# Patient Record
Sex: FEMALE | Race: WHITE | URBAN - METROPOLITAN AREA
[De-identification: names, ages, dates, MRNs, and addresses within clinical notes are randomized per-mention and may not be internally consistent; named-entity substitution may affect disease eponyms.]

---

## 2020-10-09 ENCOUNTER — PROCEDURE (OUTPATIENT)
Dept: URBAN - METROPOLITAN AREA CLINIC 68 | Facility: CLINIC | Age: 60
End: 2020-10-09
Payer: COMMERCIAL

## 2020-10-09 PROCEDURE — 92134 CPTRZ OPH DX IMG PST SGM RTA: CPT | Performed by: OPHTHALMOLOGY

## 2020-10-09 ASSESSMENT — INTRAOCULAR PRESSURE
OD: 11
OS: 12

## 2020-11-06 ENCOUNTER — OFFICE VISIT (OUTPATIENT)
Dept: URBAN - METROPOLITAN AREA CLINIC 68 | Facility: CLINIC | Age: 60
End: 2020-11-06
Payer: COMMERCIAL

## 2020-11-06 PROCEDURE — 92134 CPTRZ OPH DX IMG PST SGM RTA: CPT | Performed by: OPHTHALMOLOGY

## 2020-11-06 PROCEDURE — 92014 COMPRE OPH EXAM EST PT 1/>: CPT | Performed by: OPHTHALMOLOGY

## 2020-11-06 ASSESSMENT — INTRAOCULAR PRESSURE
OD: 11
OS: 13

## 2020-11-06 NOTE — IMPRESSION/PLAN
Impression: Exudative age-rel mclr degn, bi, with actv chrdl neovas  H35.5824.
s/p EYL OU 10/9/20 OCT OU= no SRF/IRF /211 Plan: Stable on exam and imaging. RBAC's of treat prn vs treat standing vs treat and extend d/w patient. Patient elects ext = treat OU Discussed R,B,A of Avastin vs Lucentis vs Eylea vs Beovu injection. Discussed signs and symptoms of inflammation, endophthalmitis, vitreous hemorrhage, retinal tear, retinal detachment. Patient understands and wishes to proceed with Eylea injection today. Timeout was performed before procedure. After 2% Subconjunctival anesthesia & betadine prep, Eylea  was injected with no complications. Overfill discarded. 

5-6 weeks OCT OU re-eval Eylea OU

## 2020-11-06 NOTE — IMPRESSION/PLAN
Impression: retinal break OD
S/P laser Plan: Retinal break well surrounded by laser. No new tears, extension of break, or retinal detachment seen. No ERM/CME on exam.
Retinal detachment warnings given. Call ASAP with changes.

## 2020-12-11 ENCOUNTER — OFFICE VISIT (OUTPATIENT)
Dept: URBAN - METROPOLITAN AREA CLINIC 73 | Facility: CLINIC | Age: 60
End: 2020-12-11
Payer: COMMERCIAL

## 2020-12-11 PROCEDURE — 92134 CPTRZ OPH DX IMG PST SGM RTA: CPT | Performed by: OPHTHALMOLOGY

## 2020-12-11 ASSESSMENT — INTRAOCULAR PRESSURE
OS: 17
OD: 16

## 2020-12-11 NOTE — IMPRESSION/PLAN
Impression: Exudative age-rel mclr degn, bi, with actv chrdl neovas  H35.1700.
s/p EYL OU 11/6/20 OCT OU= no SRF/IRF /213 Plan: Stable on exam and imaging. RBAC's of treat prn vs treat standing vs treat and extend d/w patient. Patient elects ext = treat OU Discussed R,B,A of Avastin vs Lucentis vs Eylea vs Beovu injection. Discussed signs and symptoms of inflammation, endophthalmitis, vitreous hemorrhage, retinal tear, retinal detachment. Patient understands and wishes to proceed with Eylea injection today. Timeout was performed before procedure. After 2% Subconjunctival anesthesia & betadine prep, Eylea  was injected with no complications. Overfill discarded. 

6-7 weeks OCT OU re-eval Eylea OU

## 2021-01-22 ENCOUNTER — OFFICE VISIT (OUTPATIENT)
Dept: URBAN - METROPOLITAN AREA CLINIC 73 | Facility: CLINIC | Age: 61
End: 2021-01-22
Payer: COMMERCIAL

## 2021-01-22 PROCEDURE — 92134 CPTRZ OPH DX IMG PST SGM RTA: CPT | Performed by: OPHTHALMOLOGY

## 2021-01-22 ASSESSMENT — INTRAOCULAR PRESSURE
OS: 12
OD: 10

## 2021-01-22 NOTE — IMPRESSION/PLAN
Impression: Exudative age-rel mclr degn, bi, with actv chrdl neovas  H35.3231.
s/p EYL OU 11/6/20 OCT OU= no SRF/IRF OD tr IRF /217 Plan: Min fluid OS on imaging. Otherwise stable. RBAC's of treat prn vs treat standing vs treat and extend d/w patient. Patient elects ext = treat OU Discussed R,B,A of Avastin vs Lucentis vs Eylea vs Beovu injection. Discussed signs and symptoms of inflammation, endophthalmitis, vitreous hemorrhage, retinal tear, retinal detachment. Patient understands and wishes to proceed with Eylea injection today. Timeout was performed before procedure. After 2% Subconjunctival anesthesia & betadine prep, Eylea  was injected with no complications. Overfill discarded. 

4-6 weeks OCT/Eylea OU #2/3

## 2021-02-19 ENCOUNTER — PROCEDURE (OUTPATIENT)
Dept: URBAN - METROPOLITAN AREA CLINIC 73 | Facility: CLINIC | Age: 61
End: 2021-02-19
Payer: COMMERCIAL

## 2021-02-19 PROCEDURE — 92134 CPTRZ OPH DX IMG PST SGM RTA: CPT | Performed by: OPHTHALMOLOGY

## 2021-02-19 ASSESSMENT — INTRAOCULAR PRESSURE
OD: 9
OS: 12

## 2021-03-19 ENCOUNTER — PROCEDURE (OUTPATIENT)
Dept: URBAN - METROPOLITAN AREA CLINIC 73 | Facility: CLINIC | Age: 61
End: 2021-03-19
Payer: COMMERCIAL

## 2021-03-19 PROCEDURE — 92134 CPTRZ OPH DX IMG PST SGM RTA: CPT | Performed by: OPHTHALMOLOGY

## 2021-03-19 ASSESSMENT — INTRAOCULAR PRESSURE
OS: 13
OD: 13

## 2021-04-16 ENCOUNTER — OFFICE VISIT (OUTPATIENT)
Dept: URBAN - METROPOLITAN AREA CLINIC 73 | Facility: CLINIC | Age: 61
End: 2021-04-16
Payer: COMMERCIAL

## 2021-04-16 PROCEDURE — 92134 CPTRZ OPH DX IMG PST SGM RTA: CPT | Performed by: OPHTHALMOLOGY

## 2021-04-16 ASSESSMENT — INTRAOCULAR PRESSURE
OD: 10
OS: 11

## 2021-04-16 NOTE — IMPRESSION/PLAN
Impression: Exudative age-rel mclr degn, bi, with actv chrdl neovas  H35.3231.
s/p EYL OU 3/19/21 OCT OU= no SRF/IRF OD no SRF/IRF OS /252 Plan: Stable at 4 weeks RBAC's of treat prn vs treat standing vs treat and extend d/w patient. Patient elects ext = treat OU Discussed R,B,A of Avastin vs Lucentis vs Eylea vs Beovu injection. Discussed signs and symptoms of inflammation, endophthalmitis, vitreous hemorrhage, retinal tear, retinal detachment. Patient understands and wishes to proceed with Eylea injection today. Timeout was performed before procedure. After 2% Subconjunctival anesthesia & betadine prep, Eylea  was injected with no complications. Overfill discarded. 

5-6 weeks OCT re-eval Eylea OU

## 2021-05-14 ENCOUNTER — OFFICE VISIT (OUTPATIENT)
Dept: URBAN - METROPOLITAN AREA CLINIC 73 | Facility: CLINIC | Age: 61
End: 2021-05-14
Payer: COMMERCIAL

## 2021-05-14 PROCEDURE — 92134 CPTRZ OPH DX IMG PST SGM RTA: CPT | Performed by: OPHTHALMOLOGY

## 2021-05-14 ASSESSMENT — INTRAOCULAR PRESSURE
OD: 13
OS: 12

## 2021-05-14 NOTE — IMPRESSION/PLAN
Impression: Exudative age-rel mclr degn, bi, with actv chrdl neovas  H35.3231.
s/p EYL OU 04/16/2021 OCT OU= no SRF/IRF OD no SRF/IRF OS  / 208 Plan: Stable at 4 weeks RBAC's of treat prn vs treat standing vs treat and extend d/w patient. Patient elects ext = treat OU Discussed R,B,A of Avastin vs Lucentis vs Eylea vs Beovu injection. Discussed signs and symptoms of inflammation, endophthalmitis, vitreous hemorrhage, retinal tear, retinal detachment. Patient understands and wishes to proceed with Eylea injection today. Timeout was performed before procedure. After 2% Subconjunctival anesthesia & betadine prep, Eylea  was injected with no complications. Overfill discarded. 

5-6 weeks OCT re-eval Eylea OU

## 2021-05-28 ENCOUNTER — OFFICE VISIT (OUTPATIENT)
Dept: URBAN - METROPOLITAN AREA CLINIC 73 | Facility: CLINIC | Age: 61
End: 2021-05-28
Payer: COMMERCIAL

## 2021-05-28 PROCEDURE — 92134 CPTRZ OPH DX IMG PST SGM RTA: CPT | Performed by: OPHTHALMOLOGY

## 2021-05-28 PROCEDURE — 99213 OFFICE O/P EST LOW 20 MIN: CPT | Performed by: OPHTHALMOLOGY

## 2021-05-28 ASSESSMENT — INTRAOCULAR PRESSURE
OS: 13
OD: 16

## 2021-05-28 NOTE — IMPRESSION/PLAN
Impression: Vitreous degeneration, bilateral: H43.813. New onset OS (05/28/2021) Plan: No retinal breaks were identified on exam.  The findings were discussed with the patient. The signs and symptoms of a retinal tear and detachment were again reviewed with the patient. The patient was advised to return if they noted any new floaters, flashing lights or a shadow in their vision.

## 2021-05-28 NOTE — IMPRESSION/PLAN
Impression: Exudative age-rel mclr degn, bi, with actv chrdl neovas  H35.3231.
s/p EYL OU 05/14/2021 OCT OU= no SRF/IRF OD no SRF/IRF OS  / 212 Plan: Stable Continue with extension as per plan f/u as scheduled in about one month OCT OU re-eval Eylea OU (and check PVD OS)

## 2021-06-25 ENCOUNTER — OFFICE VISIT (OUTPATIENT)
Dept: URBAN - METROPOLITAN AREA CLINIC 73 | Facility: CLINIC | Age: 61
End: 2021-06-25
Payer: COMMERCIAL

## 2021-06-25 DIAGNOSIS — H35.3231 EXUDATIVE AGE-REL MCLR DEGN, BI, WITH ACTV CHRDL NEOVAS: Primary | ICD-10-CM

## 2021-06-25 PROCEDURE — 92134 CPTRZ OPH DX IMG PST SGM RTA: CPT | Performed by: OPHTHALMOLOGY

## 2021-06-25 ASSESSMENT — INTRAOCULAR PRESSURE
OS: 14
OD: 10

## 2021-06-25 NOTE — IMPRESSION/PLAN
Impression: Exudative age-rel mclr degn, bi, with actv chrdl neovas  H35.3231.
s/p EYL OU 05/14/2021 OCT OU= no SRF/IRF OD no SRF/IRF OS  / 209 Plan: Stable at 4 weeks RBAC's of treat prn vs treat standing vs treat and extend d/w patient. Patient elects ext = treat OU Discussed R,B,A of Avastin vs Lucentis vs Eylea vs Beovu injection. Discussed signs and symptoms of inflammation, endophthalmitis, vitreous hemorrhage, retinal tear, retinal detachment. Patient understands and wishes to proceed with Eylea injection today. Timeout was performed before procedure. After 2% Subconjunctival anesthesia & betadine prep, Eylea  was injected with no complications. Overfill discarded. 

7-8 weeks OCT re-eval Eylea OU

## 2021-08-20 ENCOUNTER — OFFICE VISIT (OUTPATIENT)
Dept: URBAN - METROPOLITAN AREA CLINIC 73 | Facility: CLINIC | Age: 61
End: 2021-08-20
Payer: COMMERCIAL

## 2021-08-20 PROCEDURE — 67028 INJECTION EYE DRUG: CPT | Performed by: OPHTHALMOLOGY

## 2021-08-20 PROCEDURE — 92134 CPTRZ OPH DX IMG PST SGM RTA: CPT | Performed by: OPHTHALMOLOGY

## 2021-08-20 ASSESSMENT — INTRAOCULAR PRESSURE
OS: 14
OD: 14

## 2021-08-20 NOTE — IMPRESSION/PLAN
Impression: Exudative age-rel mclr degn, bi, with actv chrdl neovas  H35.3231.
s/p EYL OU 06/25/2021 OCT OU= no SRF/IRF OD no SRF/IRF OS  / 207 Plan: Stable at 8 weeks RBAC's of treat prn vs treat standing vs treat and extend d/w patient. Patient elects ext = treat OU Discussed R,B,A of Avastin vs Lucentis vs Eylea vs Beovu injection. Discussed signs and symptoms of inflammation, endophthalmitis, vitreous hemorrhage, retinal tear, retinal detachment. Patient understands and wishes to proceed with Eylea injection today. Timeout was performed before procedure. After 2% Subconjunctival anesthesia & betadine prep, Eylea  was injected with no complications. Overfill discarded. 

10 weeks OCT re-eval Eylea OU

## 2021-10-29 ENCOUNTER — OFFICE VISIT (OUTPATIENT)
Dept: URBAN - METROPOLITAN AREA CLINIC 73 | Facility: CLINIC | Age: 61
End: 2021-10-29
Payer: COMMERCIAL

## 2021-10-29 DIAGNOSIS — H43.813 VITREOUS DEGENERATION, BILATERAL: ICD-10-CM

## 2021-10-29 PROCEDURE — 92134 CPTRZ OPH DX IMG PST SGM RTA: CPT | Performed by: OPHTHALMOLOGY

## 2021-10-29 PROCEDURE — 99213 OFFICE O/P EST LOW 20 MIN: CPT | Performed by: OPHTHALMOLOGY

## 2021-10-29 ASSESSMENT — INTRAOCULAR PRESSURE
OS: 13
OD: 12

## 2021-10-29 NOTE — IMPRESSION/PLAN
Impression: Exudative age-rel mclr degn, bi, with actv chrdl neovas  H35.3231.
s/p EYL OU 08/20/2021 OCT OU= no SRF/IRF OD no SRF/IRF OS  / 212  Plan: Stable at 10 weeks RBAC's of treat prn vs treat standing vs treat and extend d/w patient. Patient elects ext = treat OU Discussed R,B,A of Avastin vs Lucentis vs Eylea vs Beovu injection. Discussed signs and symptoms of inflammation, endophthalmitis, vitreous hemorrhage, retinal tear, retinal detachment. Patient understands and wishes to proceed with Eylea injection today. Timeout was performed before procedure. After 2% Subconjunctival anesthesia & betadine prep, Eylea  was injected with no complications. Overfill discarded. 

12 weeks OCT re-eval Eylea OU

## 2022-01-21 ENCOUNTER — OFFICE VISIT (OUTPATIENT)
Dept: URBAN - METROPOLITAN AREA CLINIC 73 | Facility: CLINIC | Age: 62
End: 2022-01-21
Payer: COMMERCIAL

## 2022-01-21 DIAGNOSIS — H25.13 AGE-RELATED NUCLEAR CATARACT, BILATERAL: ICD-10-CM

## 2022-01-21 PROCEDURE — 99214 OFFICE O/P EST MOD 30 MIN: CPT | Performed by: OPHTHALMOLOGY

## 2022-01-21 PROCEDURE — 92134 CPTRZ OPH DX IMG PST SGM RTA: CPT | Performed by: OPHTHALMOLOGY

## 2022-01-21 ASSESSMENT — INTRAOCULAR PRESSURE
OD: 17
OS: 16

## 2022-01-21 NOTE — IMPRESSION/PLAN
Impression: Exudative age-rel mclr degn, bi, with actv chrdl neovas  H35.3231.
s/p EYL OU 10/29/2021 OCT OU= no SRF/IRF OD no SRF/IRF OS  / 223 Plan: Stable at 10 weeks Mild IRF OS at 12 week interval (today) RBAC's of treat prn vs treat standing vs treat and extend d/w patient. Patient elects ext = treat OU Discussed R,B,A of Avastin vs Lucentis vs Eylea vs Beovu injection. Discussed signs and symptoms of inflammation, endophthalmitis, vitreous hemorrhage, retinal tear, retinal detachment. Patient understands and wishes to proceed with Eylea injection today. Timeout was performed before procedure. After 2% Subconjunctival anesthesia & betadine prep, Eylea  was injected with no complications. Overfill discarded. 

10 weeks OCT re-eval Eylea OU

## 2022-04-06 ENCOUNTER — OFFICE VISIT (OUTPATIENT)
Dept: URBAN - METROPOLITAN AREA CLINIC 54 | Facility: CLINIC | Age: 62
End: 2022-04-06
Payer: COMMERCIAL

## 2022-04-06 DIAGNOSIS — H33.301 RETINAL BREAK OF RIGHT EYE: ICD-10-CM

## 2022-04-06 PROCEDURE — 99213 OFFICE O/P EST LOW 20 MIN: CPT | Performed by: OPHTHALMOLOGY

## 2022-04-06 PROCEDURE — 67028 INJECTION EYE DRUG: CPT | Performed by: OPHTHALMOLOGY

## 2022-04-06 PROCEDURE — 92134 CPTRZ OPH DX IMG PST SGM RTA: CPT | Performed by: OPHTHALMOLOGY

## 2022-04-06 ASSESSMENT — INTRAOCULAR PRESSURE
OS: 12
OD: 12

## 2022-04-29 ENCOUNTER — PROCEDURE (OUTPATIENT)
Dept: URBAN - METROPOLITAN AREA CLINIC 73 | Facility: CLINIC | Age: 62
End: 2022-04-29
Payer: COMMERCIAL

## 2022-04-29 DIAGNOSIS — H35.3231 EXUDATIVE AGE-RELATED MACULAR DEGENERATION, BILATERAL, WITH ACTIVE CHOROIDAL NEOVASCULARIZATION: Primary | ICD-10-CM

## 2022-04-29 PROCEDURE — 67028 INJECTION EYE DRUG: CPT | Performed by: OPHTHALMOLOGY

## 2022-04-29 ASSESSMENT — INTRAOCULAR PRESSURE
OD: 13
OS: 15

## 2022-06-24 ENCOUNTER — OFFICE VISIT (OUTPATIENT)
Dept: URBAN - METROPOLITAN AREA CLINIC 73 | Facility: CLINIC | Age: 62
End: 2022-06-24
Payer: MEDICARE

## 2022-06-24 DIAGNOSIS — H33.301 RETINAL BREAK OF RIGHT EYE: ICD-10-CM

## 2022-06-24 DIAGNOSIS — H25.13 AGE-RELATED NUCLEAR CATARACT, BILATERAL: ICD-10-CM

## 2022-06-24 DIAGNOSIS — H35.3231 EXUDATIVE AGE-REL MCLR DEGN, BI, WITH ACTV CHRDL NEOVAS: Primary | ICD-10-CM

## 2022-06-24 DIAGNOSIS — H43.813 VITREOUS DEGENERATION, BILATERAL: ICD-10-CM

## 2022-06-24 PROCEDURE — 99213 OFFICE O/P EST LOW 20 MIN: CPT | Performed by: OPHTHALMOLOGY

## 2022-06-24 PROCEDURE — 92134 CPTRZ OPH DX IMG PST SGM RTA: CPT | Performed by: OPHTHALMOLOGY

## 2022-06-24 ASSESSMENT — INTRAOCULAR PRESSURE
OD: 12
OS: 12

## 2022-06-24 NOTE — IMPRESSION/PLAN
Impression: Exudative age-rel mclr degn, bi, with actv chrdl neovas  H35.3231.
s/p EYL OS 04/06/2022, OD 04/29/2022 OCT OU= no SRF/IRF OU  / 214 Plan: Stable at 8-10 weeks 
had mild IRF OS at 12 weeks RBAC's of treat prn vs treat standing vs treat and extend d/w patient. Patient elects ext = treat OU Discussed R,B,A of Avastin vs Lucentis vs Eylea vs Beovu injection. Discussed signs and symptoms of inflammation, endophthalmitis, vitreous hemorrhage, retinal tear, retinal detachment. Patient understands and wishes to proceed with Eylea injection today. Timeout was performed before procedure. After 2% Subconjunctival anesthesia & betadine prep, Eylea  was injected with no complications. Overfill discarded. 

10 weeks OCT OU re eval Eylea OU

## 2022-08-25 ENCOUNTER — OFFICE VISIT (OUTPATIENT)
Facility: LOCATION | Age: 62
End: 2022-08-25
Payer: MEDICARE

## 2022-08-25 DIAGNOSIS — H35.3231 EXUDATIVE AGE-REL MCLR DEGN, BI, WITH ACTV CHRDL NEOVAS: Primary | ICD-10-CM

## 2022-08-25 DIAGNOSIS — H33.301 RETINAL BREAK OF RIGHT EYE: ICD-10-CM

## 2022-08-25 DIAGNOSIS — H25.13 AGE-RELATED NUCLEAR CATARACT, BILATERAL: ICD-10-CM

## 2022-08-25 DIAGNOSIS — H43.813 VITREOUS DEGENERATION, BILATERAL: ICD-10-CM

## 2022-08-25 PROCEDURE — 92134 CPTRZ OPH DX IMG PST SGM RTA: CPT | Performed by: OPHTHALMOLOGY

## 2022-08-25 ASSESSMENT — INTRAOCULAR PRESSURE
OD: 9
OS: 11

## 2022-08-25 NOTE — IMPRESSION/PLAN
Impression: Retinal break of right eye: H33.301 OD. S/P laser in the past  Plan: Retinal break well surrounded by laser. No new tears, extension of break, or retinal detachment seen. No ERM/CME on exam.
Retinal detachment warnings given. Call ASAP with changes.

## 2022-08-25 NOTE — IMPRESSION/PLAN
Impression: Exudative age-rel mclr degn, bi, with actv chrdl neovas  H35.3231. OCT OU= no SRF/IRF OU  / 223 
s/p Eylea OU 06/24/2022 Plan: Stable at 8-10 weeks 
had mild IRF OS at 12 weeks RBA's of treat prn vs treat standing vs treat and extend d/w patient. Patient elects q2-3m series Discussed R,B,A of Avastin vs Lucentis vs Eylea vs Beovu injection. Discussed signs and symptoms of inflammation, endophthalmitis, vitreous hemorrhage, retinal tear, retinal detachment. Patient understands and wishes to proceed with Eylea injection today. Timeout was performed before procedure. After 2% Subconjunctival anesthesia & betadine prep, Eylea  was injected with no complications. Overfill discarded. 

2-3m OCT OU/Eylea OU #2/3

## 2022-10-27 ENCOUNTER — PROCEDURE (OUTPATIENT)
Facility: LOCATION | Age: 62
End: 2022-10-27
Payer: MEDICARE

## 2022-10-27 DIAGNOSIS — H35.3231 EXUDATIVE AGE-RELATED MACULAR DEGENERATION, BILATERAL, WITH ACTIVE CHOROIDAL NEOVASCULARIZATION: Primary | ICD-10-CM

## 2022-10-27 PROCEDURE — 92134 CPTRZ OPH DX IMG PST SGM RTA: CPT | Performed by: OPHTHALMOLOGY

## 2022-10-27 ASSESSMENT — INTRAOCULAR PRESSURE
OS: 10
OD: 11

## 2023-01-12 ENCOUNTER — PROCEDURE (OUTPATIENT)
Facility: LOCATION | Age: 63
End: 2023-01-12
Payer: MEDICARE

## 2023-01-12 DIAGNOSIS — H35.3231 EXUDATIVE AGE-RELATED MACULAR DEGENERATION, BILATERAL, WITH ACTIVE CHOROIDAL NEOVASCULARIZATION: Primary | ICD-10-CM

## 2023-01-12 PROCEDURE — 92134 CPTRZ OPH DX IMG PST SGM RTA: CPT | Performed by: OPHTHALMOLOGY

## 2023-01-12 ASSESSMENT — INTRAOCULAR PRESSURE
OD: 14
OS: 18

## 2023-04-13 ENCOUNTER — PROCEDURE (OUTPATIENT)
Facility: LOCATION | Age: 63
End: 2023-04-13
Payer: MEDICARE

## 2023-04-13 DIAGNOSIS — H35.3231 EXUDATIVE AGE-RELATED MACULAR DEGENERATION, BILATERAL, WITH ACTIVE CHOROIDAL NEOVASCULARIZATION: Primary | ICD-10-CM

## 2023-04-13 PROCEDURE — 92134 CPTRZ OPH DX IMG PST SGM RTA: CPT | Performed by: OPHTHALMOLOGY

## 2023-04-13 ASSESSMENT — INTRAOCULAR PRESSURE
OS: 12
OD: 17

## 2023-07-13 ENCOUNTER — OFFICE VISIT (OUTPATIENT)
Facility: LOCATION | Age: 63
End: 2023-07-13
Payer: MEDICARE

## 2023-07-13 DIAGNOSIS — H25.13 AGE-RELATED NUCLEAR CATARACT, BILATERAL: ICD-10-CM

## 2023-07-13 DIAGNOSIS — H35.3231 EXUDATIVE AGE-REL MCLR DEGN, BI, WITH ACTV CHRDL NEOVAS: Primary | ICD-10-CM

## 2023-07-13 DIAGNOSIS — H33.301 RETINAL BREAK OF RIGHT EYE: ICD-10-CM

## 2023-07-13 DIAGNOSIS — H43.813 VITREOUS DEGENERATION, BILATERAL: ICD-10-CM

## 2023-07-13 PROCEDURE — 92134 CPTRZ OPH DX IMG PST SGM RTA: CPT | Performed by: OPHTHALMOLOGY

## 2023-07-13 PROCEDURE — 99214 OFFICE O/P EST MOD 30 MIN: CPT | Performed by: OPHTHALMOLOGY

## 2023-07-13 ASSESSMENT — INTRAOCULAR PRESSURE
OD: 11
OS: 11

## 2023-07-13 NOTE — IMPRESSION/PLAN
Impression: Exudative age-rel mclr degn, bi, with actv chrdl neovas  H35.3231. OCT OU= no SRF/IRF OU  / 223 
s/p Eylea OU 06/24/2022 Plan: Stable at 8-10 weeks and with mild IRF OS at 12 weeks Now with tr IRF OS at 13 weeks RBA's of treat prn vs treat standing vs treat and extend d/w patient. Patient elects q2-3m series Discussed R,B,A of Avastin vs Lucentis vs Eylea vs Beovu injection. Discussed signs and symptoms of inflammation, endophthalmitis, vitreous hemorrhage, retinal tear, retinal detachment. Patient understands and wishes to proceed with Eylea injection today. Timeout was performed before procedure. After 2% Subconjunctival anesthesia & betadine prep, Eylea  was injected with no complications. Overfill discarded. 

2-3m OCT OU/Eylea OU #2/3

## 2023-09-14 ENCOUNTER — PROCEDURE (OUTPATIENT)
Facility: LOCATION | Age: 63
End: 2023-09-14
Payer: MEDICARE

## 2023-09-14 DIAGNOSIS — H35.3231 EXUDATIVE AGE-RELATED MACULAR DEGENERATION, BILATERAL, WITH ACTIVE CHOROIDAL NEOVASCULARIZATION: Primary | ICD-10-CM

## 2023-09-14 PROCEDURE — 92134 CPTRZ OPH DX IMG PST SGM RTA: CPT | Performed by: OPHTHALMOLOGY

## 2023-09-14 ASSESSMENT — INTRAOCULAR PRESSURE
OD: 13
OS: 12

## 2023-12-12 ENCOUNTER — OFFICE VISIT (OUTPATIENT)
Facility: LOCATION | Age: 63
End: 2023-12-12
Payer: MEDICARE

## 2023-12-12 DIAGNOSIS — H35.3231 EXUDATIVE AGE-RELATED MACULAR DEGENERATION, BILATERAL, WITH ACTIVE CHOROIDAL NEOVASCULARIZATION: Primary | ICD-10-CM

## 2023-12-12 PROCEDURE — 92134 CPTRZ OPH DX IMG PST SGM RTA: CPT | Performed by: STUDENT IN AN ORGANIZED HEALTH CARE EDUCATION/TRAINING PROGRAM

## 2023-12-12 ASSESSMENT — INTRAOCULAR PRESSURE
OD: 20
OS: 14

## 2024-01-25 NOTE — IMPRESSION/PLAN
Impression: Exudative age-rel mclr degn, bi, with actv chrdl neovas  H35.3231.
s/p EYL OU 01/21/2022 OCT OU= no SRF/IRF OU  / 237 Plan: Stable at 10 weeks Mild IRF OS at 12 week interval (today) RBAC's of treat prn vs treat standing vs treat and extend d/w patient. Patient elects ext = treat OS ( patient elects to treat OS today then 1-2 weeks OD ) Discussed R,B,A of Avastin vs Lucentis vs Eylea vs Beovu injection. Discussed signs and symptoms of inflammation, endophthalmitis, vitreous hemorrhage, retinal tear, retinal detachment. Patient understands and wishes to proceed with Eylea injection today. Timeout was performed before procedure. After 2% Subconjunctival anesthesia & betadine prep, Eylea  was injected with no complications. Overfill discarded. 

1-2 weeks Eylea OD 
then 8 weeks OCT OU re eval Eylea OU (PROVENTIL;VENTOLIN;PROAIR) 108 (90 Base) MCG/ACT inhaler Inhale 2 puffs into the lungs every 6 hours as needed for Wheezing    Ana Bennett MD   azelastine HCl 0.15 % SOLN by Nasal route  Patient not taking: Reported on 2024    Ana Bennett MD   albuterol (PROVENTIL) (2.5 MG/3ML) 0.083% nebulizer solution Take 3 mLs by nebulization every 6 hours as needed for Wheezing    Ana Bennett MD   meloxicam (MOBIC) 15 MG tablet TAKE 1 TABLET BY MOUTH ONCE  DAILY  Patient not taking: Reported on 2024 10/6/23   Cameron Ledezma MD     Past Medical History    has a past medical history of Allergic rhinitis, Asthma, GERD (gastroesophageal reflux disease), Hyperlipidemia, Hypertension, Kidney stones, Osteoarthritis, PONV (postoperative nausea and vomiting), Spinal cord cysts, Under care of team, and Under care of team.  Past Surgical History   has a past surgical history that includes laminectomy; Carpal tunnel release (Right); cervical laminectomy; Lithotripsy; Abdomen surgery (); back surgery; Appendectomy (); Nerve Surgery (Right); Neck surgery (); Tonsillectomy; Adenoidectomy; Cataract extraction, bilateral; Dilation and curettage of uterus; Colonoscopy; knee surgery (Right); Lithotripsy; Tubal ligation (); and Cystocopy (2024).  Social History   reports that she quit smoking about 42 years ago. Her smoking use included cigarettes. She started smoking about 55 years ago. She has a 3.3 pack-year smoking history. She has never used smokeless tobacco.    reports current alcohol use.    reports no history of drug use.   Marital Status   Children 2  Occupation teacher- retired, 1st  Family History  Family Status   Relation Name Status    Mother      Father      Sister      Sister  Alive    Brother       family history includes Arthritis in her father; Cancer in her brother, sister, and sister; Diabetes in her father; Heart Disease in  1/25/24  Labs pending: drawn 1/25/2024   IMPRESSIONS:   Kidney stone    PLANS:   Cysto, PCNL- Right     EMILIA OCHOA - CNP  Electronically signed 1/25/2024 at 11:11 AM

## 2024-02-13 ENCOUNTER — OFFICE VISIT (OUTPATIENT)
Facility: LOCATION | Age: 64
End: 2024-02-13
Payer: MEDICARE

## 2024-02-13 DIAGNOSIS — H43.813 VITREOUS DEGENERATION, BILATERAL: ICD-10-CM

## 2024-02-13 DIAGNOSIS — H33.301 RETINAL BREAK OF RIGHT EYE: ICD-10-CM

## 2024-02-13 DIAGNOSIS — H25.13 AGE-RELATED NUCLEAR CATARACT, BILATERAL: ICD-10-CM

## 2024-02-13 PROCEDURE — 99214 OFFICE O/P EST MOD 30 MIN: CPT | Performed by: STUDENT IN AN ORGANIZED HEALTH CARE EDUCATION/TRAINING PROGRAM

## 2024-02-13 PROCEDURE — 92134 CPTRZ OPH DX IMG PST SGM RTA: CPT | Performed by: STUDENT IN AN ORGANIZED HEALTH CARE EDUCATION/TRAINING PROGRAM

## 2024-02-13 ASSESSMENT — INTRAOCULAR PRESSURE
OD: 14
OS: 15

## 2024-04-02 ENCOUNTER — OFFICE VISIT (OUTPATIENT)
Facility: LOCATION | Age: 64
End: 2024-04-02
Payer: MEDICARE

## 2024-04-02 DIAGNOSIS — H35.3231 EXUDATIVE AGE-RELATED MACULAR DEGENERATION, BILATERAL, WITH ACTIVE CHOROIDAL NEOVASCULARIZATION: Primary | ICD-10-CM

## 2024-04-02 PROCEDURE — 92134 CPTRZ OPH DX IMG PST SGM RTA: CPT | Performed by: STUDENT IN AN ORGANIZED HEALTH CARE EDUCATION/TRAINING PROGRAM

## 2024-04-02 ASSESSMENT — INTRAOCULAR PRESSURE
OS: 13
OD: 12

## 2024-05-28 ENCOUNTER — OFFICE VISIT (OUTPATIENT)
Facility: LOCATION | Age: 64
End: 2024-05-28
Payer: MEDICARE

## 2024-05-28 DIAGNOSIS — H35.3231 EXUDATIVE AGE-RELATED MACULAR DEGENERATION, BILATERAL, WITH ACTIVE CHOROIDAL NEOVASCULARIZATION: Primary | ICD-10-CM

## 2024-05-28 PROCEDURE — 92134 CPTRZ OPH DX IMG PST SGM RTA: CPT | Performed by: STUDENT IN AN ORGANIZED HEALTH CARE EDUCATION/TRAINING PROGRAM

## 2024-05-28 ASSESSMENT — INTRAOCULAR PRESSURE
OD: 13
OS: 14

## 2024-07-23 ENCOUNTER — OFFICE VISIT (OUTPATIENT)
Facility: LOCATION | Age: 64
End: 2024-07-23
Payer: MEDICARE

## 2024-07-23 DIAGNOSIS — G43.109 MIGRAINE W/ AURA, NOT INTRACTABLE, W/O STATUS MIGRAINOSUS: ICD-10-CM

## 2024-07-23 DIAGNOSIS — H43.813 VITREOUS DEGENERATION, BILATERAL: ICD-10-CM

## 2024-07-23 DIAGNOSIS — H25.13 AGE-RELATED NUCLEAR CATARACT, BILATERAL: ICD-10-CM

## 2024-07-23 DIAGNOSIS — H35.3231 EXUDATIVE AGE-REL MCLR DEGN, BI, WITH ACTV CHRDL NEOVAS: Primary | ICD-10-CM

## 2024-07-23 PROCEDURE — 92134 CPTRZ OPH DX IMG PST SGM RTA: CPT | Performed by: STUDENT IN AN ORGANIZED HEALTH CARE EDUCATION/TRAINING PROGRAM

## 2024-07-23 PROCEDURE — 99214 OFFICE O/P EST MOD 30 MIN: CPT | Performed by: STUDENT IN AN ORGANIZED HEALTH CARE EDUCATION/TRAINING PROGRAM

## 2024-07-23 ASSESSMENT — INTRAOCULAR PRESSURE
OS: 20
OD: 19

## 2024-10-01 ENCOUNTER — OFFICE VISIT (OUTPATIENT)
Facility: LOCATION | Age: 64
End: 2024-10-01
Payer: MEDICARE

## 2024-10-01 DIAGNOSIS — H35.3231 EXUDATIVE AGE-RELATED MACULAR DEGENERATION, BILATERAL, WITH ACTIVE CHOROIDAL NEOVASCULARIZATION: Primary | ICD-10-CM

## 2024-10-01 PROCEDURE — 92134 CPTRZ OPH DX IMG PST SGM RTA: CPT | Performed by: STUDENT IN AN ORGANIZED HEALTH CARE EDUCATION/TRAINING PROGRAM

## 2024-10-01 ASSESSMENT — INTRAOCULAR PRESSURE
OS: 20
OD: 11

## 2024-12-02 ENCOUNTER — OFFICE VISIT (OUTPATIENT)
Dept: URBAN - METROPOLITAN AREA CLINIC 13 | Facility: CLINIC | Age: 64
End: 2024-12-02
Payer: MEDICARE

## 2024-12-02 DIAGNOSIS — H35.3231 EXUDATIVE AGE-RELATED MACULAR DEGENERATION, BILATERAL, WITH ACTIVE CHOROIDAL NEOVASCULARIZATION: Primary | ICD-10-CM

## 2024-12-02 PROCEDURE — 92134 CPTRZ OPH DX IMG PST SGM RTA: CPT | Performed by: STUDENT IN AN ORGANIZED HEALTH CARE EDUCATION/TRAINING PROGRAM

## 2024-12-02 ASSESSMENT — INTRAOCULAR PRESSURE
OD: 14
OS: 11

## 2025-02-03 ENCOUNTER — OFFICE VISIT (OUTPATIENT)
Dept: URBAN - METROPOLITAN AREA CLINIC 41 | Facility: CLINIC | Age: 65
End: 2025-02-03
Payer: MEDICARE

## 2025-02-03 DIAGNOSIS — H35.3231 EXUDATIVE AGE-RELATED MACULAR DEGENERATION, BILATERAL, WITH ACTIVE CHOROIDAL NEOVASCULARIZATION: Primary | ICD-10-CM

## 2025-02-03 PROCEDURE — 92134 CPTRZ OPH DX IMG PST SGM RTA: CPT | Performed by: STUDENT IN AN ORGANIZED HEALTH CARE EDUCATION/TRAINING PROGRAM

## 2025-02-03 ASSESSMENT — INTRAOCULAR PRESSURE
OD: 18
OS: 19

## 2025-04-01 ENCOUNTER — OFFICE VISIT (OUTPATIENT)
Facility: LOCATION | Age: 65
End: 2025-04-01
Payer: MEDICARE

## 2025-04-01 DIAGNOSIS — H35.3231 EXUDATIVE AGE-RELATED MACULAR DEGENERATION, BILATERAL, WITH ACTIVE CHOROIDAL NEOVASCULARIZATION: Primary | ICD-10-CM

## 2025-04-01 PROCEDURE — 92134 CPTRZ OPH DX IMG PST SGM RTA: CPT | Performed by: STUDENT IN AN ORGANIZED HEALTH CARE EDUCATION/TRAINING PROGRAM

## 2025-04-01 ASSESSMENT — INTRAOCULAR PRESSURE
OS: 14
OD: 15

## 2025-05-27 ENCOUNTER — OFFICE VISIT (OUTPATIENT)
Facility: LOCATION | Age: 65
End: 2025-05-27
Payer: MEDICARE

## 2025-05-27 DIAGNOSIS — H35.3231 EXUDATIVE AGE-RELATED MACULAR DEGENERATION, BILATERAL, WITH ACTIVE CHOROIDAL NEOVASCULARIZATION: Primary | ICD-10-CM

## 2025-05-27 PROCEDURE — 92134 CPTRZ OPH DX IMG PST SGM RTA: CPT | Performed by: STUDENT IN AN ORGANIZED HEALTH CARE EDUCATION/TRAINING PROGRAM

## 2025-05-27 ASSESSMENT — INTRAOCULAR PRESSURE
OS: 10
OD: 10

## 2025-07-22 ENCOUNTER — OFFICE VISIT (OUTPATIENT)
Facility: LOCATION | Age: 65
End: 2025-07-22
Payer: MEDICARE

## 2025-07-22 DIAGNOSIS — H25.13 AGE-RELATED NUCLEAR CATARACT, BILATERAL: ICD-10-CM

## 2025-07-22 DIAGNOSIS — H35.3231 EXUDATIVE AGE-REL MCLR DEGN, BI, WITH ACTV CHRDL NEOVAS: Primary | ICD-10-CM

## 2025-07-22 DIAGNOSIS — H43.813 VITREOUS DEGENERATION, BILATERAL: ICD-10-CM

## 2025-07-22 DIAGNOSIS — G43.109 MIGRAINE W/ AURA, NOT INTRACTABLE, W/O STATUS MIGRAINOSUS: ICD-10-CM

## 2025-07-22 PROCEDURE — 92134 CPTRZ OPH DX IMG PST SGM RTA: CPT | Performed by: STUDENT IN AN ORGANIZED HEALTH CARE EDUCATION/TRAINING PROGRAM

## 2025-07-22 PROCEDURE — 99214 OFFICE O/P EST MOD 30 MIN: CPT | Performed by: STUDENT IN AN ORGANIZED HEALTH CARE EDUCATION/TRAINING PROGRAM

## 2025-07-22 ASSESSMENT — INTRAOCULAR PRESSURE
OD: 13
OS: 15